# Patient Record
Sex: MALE | Race: WHITE | URBAN - METROPOLITAN AREA
[De-identification: names, ages, dates, MRNs, and addresses within clinical notes are randomized per-mention and may not be internally consistent; named-entity substitution may affect disease eponyms.]

---

## 2024-06-03 ENCOUNTER — TELEPHONE (OUTPATIENT)
Dept: GASTROENTEROLOGY | Age: 89
End: 2024-06-03

## 2024-06-03 NOTE — TELEPHONE ENCOUNTER
Patient/ patient's daughter (Brinda) called to check on referral. No referral seen in chart but asked JESSICA Cervantes if he had ant knowledge about referral. Per Billy, he sent SCOTT to patient for [facility] records. Called Dr Sharp's office to ask that referral be sent. Spoke to referral coordinator, Sully, who verbalized that she will fax referral to [phone] as urgent. She also will check and send images (color) that were taken during procedure as requested by Dr Pelaez.

## 2024-06-05 ENCOUNTER — TELEPHONE (OUTPATIENT)
Dept: GASTROENTEROLOGY | Age: 89
End: 2024-06-05

## 2024-06-05 PROBLEM — D12.2 ADENOMATOUS POLYP OF ASCENDING COLON: Status: ACTIVE | Noted: 2024-06-05

## 2024-06-05 NOTE — TELEPHONE ENCOUNTER
Patient's daughter called to state that 07/01 does not work for the as it is a holiday week. She is curious if his procedure could be moved to the following week.

## 2024-06-05 NOTE — TELEPHONE ENCOUNTER
Patient daughter returned call to reschedule due to family trip  Cx procedure 7/1  Rs procedure 7/8, resent updated prep instructions